# Patient Record
Sex: FEMALE | Race: WHITE | NOT HISPANIC OR LATINO | URBAN - NONMETROPOLITAN AREA
[De-identification: names, ages, dates, MRNs, and addresses within clinical notes are randomized per-mention and may not be internally consistent; named-entity substitution may affect disease eponyms.]

---

## 2019-08-23 ENCOUNTER — IMPORTED ENCOUNTER (OUTPATIENT)
Dept: URBAN - NONMETROPOLITAN AREA CLINIC 1 | Facility: CLINIC | Age: 44
End: 2019-08-23

## 2019-08-23 PROBLEM — H52.4: Noted: 2019-08-23

## 2019-08-23 PROCEDURE — 92014 COMPRE OPH EXAM EST PT 1/>: CPT

## 2019-08-23 PROCEDURE — 92310 CONTACT LENS FITTING OU: CPT

## 2019-08-23 PROCEDURE — 92015 DETERMINE REFRACTIVE STATE: CPT

## 2019-08-23 NOTE — PATIENT DISCUSSION
Compound Myopic Astigmatism OU w/Presbyopia-  discussed findings w/patient-  new spectacle/CL Rx issued-  RTC 1 year or prn; 's Notes: MR 8/23/2019DFE 8/23/2019

## 2020-09-04 ENCOUNTER — IMPORTED ENCOUNTER (OUTPATIENT)
Dept: URBAN - NONMETROPOLITAN AREA CLINIC 1 | Facility: CLINIC | Age: 45
End: 2020-09-04

## 2020-09-04 PROCEDURE — 92015 DETERMINE REFRACTIVE STATE: CPT

## 2020-09-04 PROCEDURE — 92310 CONTACT LENS FITTING OU: CPT

## 2020-09-04 PROCEDURE — 92014 COMPRE OPH EXAM EST PT 1/>: CPT

## 2020-09-04 NOTE — PATIENT DISCUSSION
Compound Myopic Astigmatism OU w/Presbyopia-  discussed findings w/patient-  new spectacle/CL Rx issued-  trying modified monovision today patient dilated not able to fully assess for vision-  patient will call us back and let us know what she prefers.   She may need to go back to [facility] if she prefers.-  RTC 1 year or prn; 's Notes: MR 9/4/2020DFE 9/4/2020

## 2020-12-14 ENCOUNTER — IMPORTED ENCOUNTER (OUTPATIENT)
Dept: URBAN - NONMETROPOLITAN AREA CLINIC 1 | Facility: CLINIC | Age: 45
End: 2020-12-14

## 2020-12-14 NOTE — PATIENT DISCUSSION
CL Check-  discussed findings w/patient-  patient was not happy with modified monovision that we did at last visit-  she was not able to see distance as well as she would like with the modifed Rx-  patient has been wearing old CL Rx since then she has not had any issues w/near vision with the old Rx-  new CL Rx issued-  continue to monitor yearly or prn; 's Notes: MR 9/4/2020DFE 9/4/2020

## 2021-09-07 ENCOUNTER — IMPORTED ENCOUNTER (OUTPATIENT)
Dept: URBAN - NONMETROPOLITAN AREA CLINIC 1 | Facility: CLINIC | Age: 46
End: 2021-09-07

## 2021-09-07 PROCEDURE — 92310 CONTACT LENS FITTING OU: CPT

## 2021-09-07 PROCEDURE — 92014 COMPRE OPH EXAM EST PT 1/>: CPT

## 2021-09-07 PROCEDURE — 92015 DETERMINE REFRACTIVE STATE: CPT

## 2021-09-07 NOTE — PATIENT DISCUSSION
Compound Myopic Astigmatism w/Presbyopia -  discussed refractive status w/patient -  new spectacle Rx issued -  new contact lens Rx issued -  continue to monitor; 's Notes: MR 9/7/2021DFE 9/7/2021

## 2022-04-09 ASSESSMENT — VISUAL ACUITY
OS_SC: J1
OD_SC: J1
OD_SC: 20/20
OD_SC: J1
OD_SC: 20/20
OU_SC: 20/20
OD_SC: J1
OD_SC: 20/20-1
OS_SC: J1
OS_SC: 20/20
OS_SC: 20/20
OD_SC: 20/20
OS_SC: 20/20
OD_SC: 20/20
OS_SC: 20/20
OD_SC: J1
OS_SC: J1
OD_SC: 20/20-
OS_SC: J1

## 2022-04-09 ASSESSMENT — TONOMETRY
OD_IOP_MMHG: 19
OS_IOP_MMHG: 20
OD_IOP_MMHG: 19
OD_IOP_MMHG: 17
OS_IOP_MMHG: 15
OS_IOP_MMHG: 19

## 2023-08-11 ENCOUNTER — COMPREHENSIVE EXAM (OUTPATIENT)
Dept: RURAL CLINIC 1 | Facility: CLINIC | Age: 48
End: 2023-08-11

## 2023-08-11 DIAGNOSIS — H52.13: ICD-10-CM

## 2023-08-11 DIAGNOSIS — H52.4: ICD-10-CM

## 2023-08-11 DIAGNOSIS — H52.223: ICD-10-CM

## 2023-08-11 PROCEDURE — 92310-E CONTACT LENS FITTING ESTABLISH PATIENT

## 2023-08-11 PROCEDURE — 92014 COMPRE OPH EXAM EST PT 1/>: CPT

## 2023-08-11 PROCEDURE — 92015 DETERMINE REFRACTIVE STATE: CPT

## 2023-08-11 ASSESSMENT — VISUAL ACUITY
OS_CC: 20/25+3
OD_CC: 20/20-1
OU_CC: 20/20

## 2023-08-11 ASSESSMENT — TONOMETRY
OS_IOP_MMHG: 20
OD_IOP_MMHG: 20

## 2024-12-31 ENCOUNTER — COMPREHENSIVE EXAM (OUTPATIENT)
Age: 49
End: 2024-12-31

## 2024-12-31 DIAGNOSIS — H52.223: ICD-10-CM

## 2024-12-31 DIAGNOSIS — H52.4: ICD-10-CM

## 2024-12-31 DIAGNOSIS — H52.13: ICD-10-CM

## 2024-12-31 PROCEDURE — 92310-1 LEVEL 1 SOFT LENS UPDATE

## 2024-12-31 PROCEDURE — 92014 COMPRE OPH EXAM EST PT 1/>: CPT

## 2024-12-31 PROCEDURE — 92015 DETERMINE REFRACTIVE STATE: CPT
